# Patient Record
Sex: FEMALE | Race: WHITE | NOT HISPANIC OR LATINO | URBAN - METROPOLITAN AREA
[De-identification: names, ages, dates, MRNs, and addresses within clinical notes are randomized per-mention and may not be internally consistent; named-entity substitution may affect disease eponyms.]

---

## 2023-04-08 ENCOUNTER — EMERGENCY (EMERGENCY)
Facility: HOSPITAL | Age: 43
LOS: 1 days | Discharge: HOME | End: 2023-04-08
Attending: STUDENT IN AN ORGANIZED HEALTH CARE EDUCATION/TRAINING PROGRAM | Admitting: STUDENT IN AN ORGANIZED HEALTH CARE EDUCATION/TRAINING PROGRAM
Payer: COMMERCIAL

## 2023-04-08 VITALS
TEMPERATURE: 98 F | DIASTOLIC BLOOD PRESSURE: 56 MMHG | RESPIRATION RATE: 18 BRPM | OXYGEN SATURATION: 97 % | SYSTOLIC BLOOD PRESSURE: 90 MMHG | HEART RATE: 76 BPM

## 2023-04-08 PROCEDURE — 93010 ELECTROCARDIOGRAM REPORT: CPT

## 2023-04-08 PROCEDURE — 99284 EMERGENCY DEPT VISIT MOD MDM: CPT

## 2023-04-08 RX ORDER — HALOPERIDOL DECANOATE 100 MG/ML
5 INJECTION INTRAMUSCULAR ONCE
Refills: 0 | Status: COMPLETED | OUTPATIENT
Start: 2023-04-08 | End: 2023-04-08

## 2023-04-08 RX ORDER — DROPERIDOL 2.5 MG/ML
5 AMPUL (ML) INJECTION ONCE
Refills: 0 | Status: DISCONTINUED | OUTPATIENT
Start: 2023-04-08 | End: 2023-04-08

## 2023-04-08 RX ORDER — MIDAZOLAM HYDROCHLORIDE 1 MG/ML
4 INJECTION, SOLUTION INTRAMUSCULAR; INTRAVENOUS ONCE
Refills: 0 | Status: DISCONTINUED | OUTPATIENT
Start: 2023-04-08 | End: 2023-04-08

## 2023-04-08 RX ADMIN — HALOPERIDOL DECANOATE 5 MILLIGRAM(S): 100 INJECTION INTRAMUSCULAR at 20:24

## 2023-04-08 RX ADMIN — Medication 1 MILLIGRAM(S): at 20:24

## 2023-04-08 NOTE — ED ADULT NURSE REASSESSMENT NOTE - NS ED NURSE REASSESS COMMENT FT1
Pt received to [facility] in handcuffs accompanied by EMS and [employer] with suspected alcohol intoxication. Pt presents highly agitated with yelling and screaming, unable to be verbally de-escalated. Pt evaluated by NP and medicated as ordered. Pt xfer to main ER #[id number].
Applied

## 2023-04-08 NOTE — ED PROVIDER NOTE - PATIENT PORTAL LINK FT
You can access the FollowMyHealth Patient Portal offered by [facility] by registering at the following website: [email or web address]. By joining [facility]’s FollowMyHealth portal, you will also be able to view your health information using other applications (apps) compatible with our system.

## 2023-04-08 NOTE — ED PROVIDER NOTE - CLINICAL SUMMARY MEDICAL DECISION MAKING FREE TEXT BOX
43 Y F presenting with episode of alcohol use, mild agitation, calm and cooperative in ER, will assure normal POCT glucose, ECG, monitor, likely Dc after sobriety no concern for SI/HI.

## 2023-04-08 NOTE — ED ADULT NURSE NOTE - CHIEF COMPLAINT QUOTE
as per EMS pt was belligerent at [facility], pt  is uncooperative in triage, unable to perform VS in triage, in handcuffs by PD. spouse called pt's cellphone and reports that pt has hx of depression and is a frequent  ETOH consumption.   Guillermo # [phone]

## 2023-04-08 NOTE — ED PROVIDER NOTE - ATTENDING CONTRIBUTION TO CARE
43-year-old female past medical history of depression on Zoloft presents to ED after agitation and alcohol use.  Brought into hospital by police after becoming agitated at a store.  Upon arrival, patient was aggressive towards self and staff and was given medication.  Psych denies suicidal or homicidal ideations. Denies auditory or visual hallucinations.  PE: +smells of alcohol,  RRR, CTA BL lungs, abd soft NTND A/P Labs, reassess

## 2023-04-08 NOTE — ED ADULT NURSE NOTE - OBJECTIVE STATEMENT
A&Ox4. Unable to determine PMH, due to patient's condition. Patient was found intoxicated in front of a bakery. Police were called and patient was brought in  handcuffs. Patient was started in  and medicated. Patient admits to drinking today and having 5-6 cocktails a day. Respirations even and unlabored. Normal sinus on monitor. Safety precautions in place and bed in lowest position. Patient currently sleeping but able to be aroused with verbal commands. Awaiting disposition from MD. Belongings placed in locker 10 & 11 in .

## 2023-04-08 NOTE — ED PROVIDER NOTE - PROGRESS NOTE DETAILS
Arnel Cartwright MD:  Patient tolerating PO, ambulating, clinically sober, remains adamant no SI, HI, safe at home, stable for DC.

## 2023-04-08 NOTE — ED ADULT TRIAGE NOTE - CHIEF COMPLAINT QUOTE
as per EMS pt was belligerent at [facility], pt  is uncooperative in triage, unable to perform VS in triage, in handcuffs by PD. spouse called pt's cellphone and reports that pt has hx of depression and is a frequent  ETOH consumption.   Guillermo # [phone]

## 2023-04-08 NOTE — ED PROVIDER NOTE - OBJECTIVE STATEMENT
43-year-old female with history of depression on Zoloft, presenting with episode of agitation in setting of alcohol use.  Per patient has been drinking for last several days for 5 drinks per day today after not anxiety in a group setting drink alcohol, became mildly agitated when attempting to purchase baked goods, escorted by police for assessment in the hospital.  Patient initially treated with 5 of haloperidol, 1 mg of lorazepam both intramuscular, patient is calm, cooperative, denying any suicidal ideation, homicidal ideation, thoughts of self-harm, history of self-harm, states "I was just drinking today".  Patient denies any abdominal pain, nausea, vomiting, difficulty breathing, weakness, recreational drug use including sympathomimetics, benzodiazepines, denies any history of alcohol withdrawal, history of ICU stays drug withdrawal.